# Patient Record
Sex: FEMALE | Race: BLACK OR AFRICAN AMERICAN | NOT HISPANIC OR LATINO | Employment: PART TIME | URBAN - METROPOLITAN AREA
[De-identification: names, ages, dates, MRNs, and addresses within clinical notes are randomized per-mention and may not be internally consistent; named-entity substitution may affect disease eponyms.]

---

## 2021-12-05 ENCOUNTER — HOSPITAL ENCOUNTER (EMERGENCY)
Facility: CLINIC | Age: 26
Discharge: HOME | End: 2021-12-05
Attending: EMERGENCY MEDICINE | Admitting: EMERGENCY MEDICINE
Payer: COMMERCIAL

## 2021-12-05 VITALS
BODY MASS INDEX: 30.06 KG/M2 | RESPIRATION RATE: 18 BRPM | WEIGHT: 210 LBS | HEIGHT: 70 IN | TEMPERATURE: 96.3 F | HEART RATE: 86 BPM | SYSTOLIC BLOOD PRESSURE: 151 MMHG | DIASTOLIC BLOOD PRESSURE: 79 MMHG | OXYGEN SATURATION: 96 %

## 2021-12-05 DIAGNOSIS — U07.1 INFECTION DUE TO 2019 NOVEL CORONAVIRUS: ICD-10-CM

## 2021-12-05 LAB
FLUAV RNA SPEC QL NAA+PROBE: NEGATIVE
FLUBV RNA RESP QL NAA+PROBE: NEGATIVE
SARS-COV-2 RNA RESP QL NAA+PROBE: POSITIVE

## 2021-12-05 PROCEDURE — 87636 SARSCOV2 & INF A&B AMP PRB: CPT | Performed by: EMERGENCY MEDICINE

## 2021-12-05 PROCEDURE — 99283 EMERGENCY DEPT VISIT LOW MDM: CPT

## 2021-12-05 PROCEDURE — C9803 HOPD COVID-19 SPEC COLLECT: HCPCS

## 2021-12-05 RX ORDER — ONDANSETRON 4 MG/1
4 TABLET, FILM COATED ORAL EVERY 8 HOURS PRN
Qty: 6 TABLET | Refills: 0 | Status: SHIPPED | OUTPATIENT
Start: 2021-12-05

## 2021-12-05 RX ORDER — ALBUTEROL SULFATE 90 UG/1
2 AEROSOL, METERED RESPIRATORY (INHALATION) EVERY 6 HOURS PRN
Qty: 18 G | Refills: 0 | Status: SHIPPED | OUTPATIENT
Start: 2021-12-05

## 2021-12-05 ASSESSMENT — ENCOUNTER SYMPTOMS
MYALGIAS: 1
NAUSEA: 1
FATIGUE: 1
SHORTNESS OF BREATH: 0
ABDOMINAL PAIN: 0
BLOOD IN STOOL: 0
COUGH: 1
VOMITING: 0

## 2021-12-05 ASSESSMENT — MIFFLIN-ST. JEOR: SCORE: 1772.8

## 2021-12-05 NOTE — Clinical Note
Allison Gar was seen and treated in our emergency department on 12/5/2021.  She may return to work on 12/19/2021.  covid     If you have any questions or concerns, please don't hesitate to call.      Steve Doan MD

## 2021-12-06 NOTE — ED PROVIDER NOTES
EMERGENCY DEPARTMENT ENCOUNTER      NAME: Allison Gar  AGE: 26 year old female  YOB: 1995  MRN: [id number]  EVALUATION DATE & TIME: 12/5/2021  7:57 PM    PCP: Deny Michel    ED PROVIDER: MENDEL Doan      Chief Complaint   Patient presents with     Shortness of Breath     Cough     FINAL IMPRESSION:  1. Infection due to 2019 novel coronavirus      ED COURSE & MEDICAL DECISION MAKING:    Pertinent Labs & Imaging studies reviewed. (See chart for details)  26 year old female presents to the Emergency Department for evaluation of cough general malaise loss of taste and nausea patient concerned she has COVID-19 infection.  On clinical examination patient was well-appearing with no concerning exam findings.  Noted to be mildly hypertensive on vital signs but otherwise unremarkable clinical exam with normal breath sounds auscultation normal oxygenation.  Clinical history examination most consistent with probable COVID-19 viral infection as she is unvaccinated and was exposed to other individuals with a positive infection.  Her COVID-19 test result did return positive.  At this point I did not feel that further work-up was indicated.  Plan of care for discharge home symptomatic management counseled patient appropriate treatment and return precautions prior to discharge    8:02 PM I met the patient and performed my initial interview and exam.      Plan and all results were discussed  Time was taken to answer all questions. Patient and/or associated parties expressed understanding and were agreeable to the treatment plan.  Warning signs and ER return precautions provided. Discharged with discharge instructions outlining plan for further care and follow up.     MEDICATIONS GIVEN IN THE EMERGENCY:  Discharge Medication List as of 12/5/2021  8:16 PM      START taking these medications    Details   albuterol (PROAIR HFA/PROVENTIL HFA/VENTOLIN HFA) 108 (90 Base) MCG/ACT inhaler Inhale 2 puffs into the  lungs every 6 hours as needed for shortness of breath / dyspnea or wheezing, Disp-18 g, R-0, [facility] may dispense brand covered by insurance (Proair, or proventil or ventolin or generic albuterol inhaler)      ondansetron (ZOFRAN) 4 MG tablet Take 1 tablet (4 mg) by mouth every 8 hours as needed for nausea, Disp-6 tablet, R-0, Local Print             NEW PRESCRIPTIONS STARTED AT TODAY'S ER VISIT  Discharge Medication List as of 12/5/2021  8:16 PM      START taking these medications    Details   albuterol (PROAIR HFA/PROVENTIL HFA/VENTOLIN HFA) 108 (90 Base) MCG/ACT inhaler Inhale 2 puffs into the lungs every 6 hours as needed for shortness of breath / dyspnea or wheezing, Disp-18 g, R-0, [facility] may dispense brand covered by insurance (Proair, or proventil or ventolin or generic albuterol inhaler)      ondansetron (ZOFRAN) 4 MG tablet Take 1 tablet (4 mg) by mouth every 8 hours as needed for nausea, Disp-6 tablet, R-0, Local Print         CONTINUE these medications which have NOT CHANGED    Details   Aspirin-Acetaminophen-Caffeine (EXCEDRIN EXTRA STRENGTH PO) Take 2 tablets by mouth daily as needed. For migraines, 2 tablet, Oral, DAILY PRN, Until Discontinued, Historical      topiramate (TOPAMAX) 25 MG tablet Take 2 tablets (50 mg) twice a day., Disp-120 tablet, R-3, E-Prescribe                =================================================================    HPI    Patient information was obtained from: Patient    Use of Intrepreter: N/A       Tryneal LISA Cong is a 26 year old female who presents to this ED for evaluation of shortness of breath.     Patient reports that has a four day history of cough, general malaise and nausea. Is concerned for covid-19. Is unvaccinated. No medical issues. Started with loss of taste and progressed to body aches, malaise, fatigue, and non productive cough. Denies any shortness of breath, vomiting, abdominal pain, change in stool, or any other complaints  "at this time.       REVIEW OF SYSTEMS   Review of Systems   Constitutional: Positive for fatigue.   HENT:        Positive for loss of taste   Respiratory: Positive for cough. Negative for shortness of breath.    Gastrointestinal: Positive for nausea. Negative for abdominal pain, blood in stool and vomiting.   Musculoskeletal: Positive for myalgias.   All other systems reviewed and are negative.       PAST MEDICAL HISTORY:  History reviewed. No pertinent past medical history.    PAST SURGICAL HISTORY:  History reviewed. No pertinent surgical history.    ALLERGIES:  No Known Allergies    FAMILY HISTORY:  No family history on file.    SOCIAL HISTORY:   Social History     Socioeconomic History     Marital status: Single     Spouse name: Not on file     Number of children: Not on file     Years of education: Not on file     Highest education level: Not on file   Occupational History     Not on file   Tobacco Use     Smoking status: Never Smoker     Smokeless tobacco: Not on file   Substance and Sexual Activity     Alcohol use: Yes     Drug use: Not on file     Sexual activity: Not on file   Other Topics Concern     Not on file   Social History Narrative     Not on file     Social Determinants of Health     Financial Resource Strain: Not on file   Food Insecurity: Not on file   Transportation Needs: Not on file   Physical Activity: Not on file   Stress: Not on file   Social Connections: Not on file   Intimate Partner Violence: Not on file   Housing Stability: Not on file       VITALS:  Patient Vitals for the past 24 hrs:   BP Temp Temp src Pulse Resp SpO2 Height Weight   12/05/21 2016 (!) 151/79 -- -- 86 18 96 % -- --   12/05/21 1832 (!) 151/89 (!) 96.3  F (35.7  C) Temporal 105 20 100 % 1.778 m (5' 10\") 95.3 kg (210 lb)       PHYSICAL EXAM   Constitutional:  Well developed, Well nourished, NAD  HENT:  Normocephalic, Atraumatic, Bilateral external ears normal, Oropharynx moist Nose normal.   Neck: Normal range of motion "   Eyes: Conjunctiva normal, No discharge.   Respiratory:  Normal breath sounds, No respiratory distress, No wheezing.  No cough.  Cardiovascular:  Normal heart rate, Normal rhythm. No murmur appreciated.  Chest wall non-tender.  GI:  Soft, No tenderness, No masses, No flank tenderness.  No rebound or guarding.  : No CVA tenderness  Musculoskeletal: Full ROM.  No edema appreciated.  No cyanosis. Good ROM of major joints.  Integument:  Warm, Dry, No erythema, No rash.    Neurologic:  Alert & oriented.  No focal deficits appreciated.  Ambulatory.  Psychiatric:  Affect normal, Judgment normal, Mood normal.     LAB:  All pertinent labs reviewed and interpreted.  Results for orders placed or performed during the hospital encounter of 12/05/21   Symptomatic Influenza A/B & SARS-CoV2 (COVID-19) Virus PCR Multiplex Nasopharyngeal    Specimen: Nasopharyngeal; Swab   Result Value Ref Range    Influenza A PCR Negative Negative    Influenza B PCR Negative Negative    SARS CoV2 PCR Positive (A) Negative       RADIOLOGY:  Reviewed all pertinent imaging. Please see official radiology report.  No orders to display       EKG:    None    PROCEDURES:   None      I, Kunal Greenevirginia, am serving as a scribe to document services personally performed by Dr. Doan based on my observation and the provider's statements to me. I, Steve Doan MD attest that Kunal Gibbs is acting in a scribe capacity, has observed my performance of the services and has documented them in accordance with my direction.    Steve Doan M.D.  Emergency Medicine  [facility] EMERGENCY ROOM  [address] [facility] [place] [id number] [phone]  Dept: [phone]     Steve Doan MD  12/05/21 [id number]

## 2021-12-06 NOTE — DISCHARGE INSTRUCTIONS
Monitor your oxygenation at home if you drop below 90% or of increasing symptoms return to your emergency department for repeat evaluation. Take Zofran for nausea albuterol for cough quarantine as discussed follow-up with your primary care doctor later this week.